# Patient Record
Sex: MALE | Race: WHITE | NOT HISPANIC OR LATINO | URBAN - METROPOLITAN AREA
[De-identification: names, ages, dates, MRNs, and addresses within clinical notes are randomized per-mention and may not be internally consistent; named-entity substitution may affect disease eponyms.]

---

## 2017-06-08 ENCOUNTER — ALLSCRIPTS OFFICE VISIT (OUTPATIENT)
Dept: OTHER | Facility: OTHER | Age: 62
End: 2017-06-08

## 2018-01-10 NOTE — MISCELLANEOUS
Message   Recorded as Task   Date: 02/22/2016 09:10 AM, Created By: Segun Rodriguez   Task Name: Call Back   Assigned To: Cheyenne Lila   Regarding Patient: Reji Au, Status: Active   Comment:    Nella Simon - 22 Feb 2016 9:10 AM     TASK CREATED  please call pt about a reaction he got from a test   home# [phone]   Cheyenne Sharp - 22 Feb 2016 4:05 PM     TASK EDITED  Marycruz Gopi reports a rash that began about a week after his fourth infusion  He states he had another infusion recently and has been no change  He denies any itchiness to the rash  He states that he is scheduled to see a dermatologist later in the week   He has noticed some improvement in the strength of his legs with the IVIG but continues to have some difficulty with stairs        Signatures   Electronically signed by : Chance Zuleta MD; Feb 22 2016  4:05PM EST                       (Author)

## 2018-01-10 NOTE — MISCELLANEOUS
Message   Recorded as Task   Date: 03/28/2016 02:52 PM, Created By: Morena Hahn   Task Name: Call Back   Assigned To: Vinicio Henriquez   Regarding Patient: Evan Kiser, Status: Active   Comment:    Morena Hahn - 28 Mar 2016 2:52 PM     TASK CREATED  Caller: Self; [phone] (Home); [phone] (Work)    pt would like to speak with navjot Henriquez - 28 Mar 2016 4:02 PM     TASK EDITED  Alan's employer will not accept him back unless he is 100% thus, he is totally disabled          Signatures   Electronically signed by : Leah Steward MD; Mar 28 2016  4:02PM EST                       (Author)

## 2018-01-12 NOTE — PROCEDURES
Results/Data    Procedure: Electromyogram and Nerve Conduction Study  Indication: Bilateral Lower Extremities   Referred by Dr Haynes  The procedure's were discussed with the patient  Written consent was obtained prior to the procedure and is detailed in the patient's record  Prior to the start of the procedure a time out was taken and the identity of the patient was confirmed via name and date of birth with the patient  The correct site and the procedure to be performed were confirmed  The correct side was confirmed if applicable  The positioning of the patient was verified  The availability of the correct equipment was verified  Procedure Start Time: 10:00    Technique: A sterile concentric needle electrode was used  The patient tolerated the procedure well  There were no complications  Results EMG BILATERAL LOWER EXTREMITY    Motor and sensory conduction studies were performed on the bilateral peroneal, tibial and sural nerves  The distal motor latency of the left peroneal nerve is prolonged at 7 9 ms while the other distal motor latencies were normal  The motor action potential amplitudes of the peroneal nerve were significantly reduced at 0 4 mV end 0 1 mV of the right and left respectively while the posterior tibial potentials were within the normal range with distal stimulation and were of reduced amplitude with more proximal stimulation  Conduction velocities of the peroneal nerve were prolonged at 40 m/s bilaterally slowing of the right tibial motor conduction velocity at 38 m/s while the left tibial conduction velocity is just within the normal range at 42 m/s  Conduction across the fibular head was normal bilaterally  Bilateral peroneal F waves were not obtainable and tibial F waves were prolonged    Bilateral sural distal sensory latencies were prolonged at 3 4 ms with reduced sensory action potential amplitudes      H  reflexes were not obtainable    Concentric needle EMG was performed in various distal and proximal muscles of the lower extremities bilaterally including EDB, tibialis anterior, gastrocnemius medius, vastus lateralis, biceps femoralis short head and the low lumbar paraspinal regions at L3, L4 and L5  There 3+ positive waves and 2+ fibrillation potentials in the right tibialis anterior, 2+ positive waves and 2+ fibrillation potentials the left tibialis anterior, 3+ positive waves and 2+ fibrillations at the quadriceps bilaterally, 1+ fibrillation potentials in the left EDB and 2+ fibrillation potentials in the left gastrocnemius medius region  No other spontaneous activities were seen  A discrete interference pattern with polyphasic potentials was noted in the EDB region bilaterally, the tibialis anterior region bilaterally as well as in the quadriceps area bilaterally The other compound motor unit potentials were of normal configuration and interference patterns were full or full for effort  IMPRESSION: This is an abnormal EMG of the bilateral lower extremities due to 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

## 2019-02-28 ENCOUNTER — TELEPHONE (OUTPATIENT)
Dept: GASTROENTEROLOGY | Facility: CLINIC | Age: 64
End: 2019-02-28

## 2019-02-28 NOTE — TELEPHONE ENCOUNTER
Called and spoke to patient he stated his PCP wants him to have a EGD     He's having intense abdominal pain and feel food sitting on his chest, belching     He was recently in the ER for the pain

## 2021-04-13 DIAGNOSIS — Z23 ENCOUNTER FOR IMMUNIZATION: ICD-10-CM

## 2024-05-17 ENCOUNTER — TELEPHONE (OUTPATIENT)
Dept: SURGERY | Facility: CLINIC | Age: 69
End: 2024-05-17

## 2025-03-21 ENCOUNTER — OFFICE VISIT (OUTPATIENT)
Dept: OBGYN CLINIC | Facility: CLINIC | Age: 70
End: 2025-03-21
Payer: COMMERCIAL

## 2025-03-21 ENCOUNTER — APPOINTMENT (OUTPATIENT)
Dept: RADIOLOGY | Facility: CLINIC | Age: 70
End: 2025-03-21
Payer: COMMERCIAL

## 2025-03-21 VITALS — BODY MASS INDEX: 25.16 KG/M2 | HEIGHT: 68 IN | WEIGHT: 166 LBS | RESPIRATION RATE: 18 BRPM

## 2025-03-21 DIAGNOSIS — R29.898 WEAKNESS OF BOTH LOWER EXTREMITIES: ICD-10-CM

## 2025-03-21 DIAGNOSIS — M25.461 EFFUSION OF RIGHT KNEE: ICD-10-CM

## 2025-03-21 DIAGNOSIS — M17.11 PRIMARY OSTEOARTHRITIS OF RIGHT KNEE: ICD-10-CM

## 2025-03-21 DIAGNOSIS — Z98.890 HISTORY OF THORACIC SURGERY: ICD-10-CM

## 2025-03-21 DIAGNOSIS — M25.561 RIGHT KNEE PAIN, UNSPECIFIED CHRONICITY: ICD-10-CM

## 2025-03-21 DIAGNOSIS — M25.561 ACUTE PAIN OF RIGHT KNEE: Primary | ICD-10-CM

## 2025-03-21 PROCEDURE — 20610 DRAIN/INJ JOINT/BURSA W/O US: CPT | Performed by: ORTHOPAEDIC SURGERY

## 2025-03-21 PROCEDURE — 99203 OFFICE O/P NEW LOW 30 MIN: CPT | Performed by: ORTHOPAEDIC SURGERY

## 2025-03-21 PROCEDURE — 73564 X-RAY EXAM KNEE 4 OR MORE: CPT

## 2025-03-21 RX ORDER — METHYLPREDNISOLONE ACETATE 40 MG/ML
2 INJECTION, SUSPENSION INTRA-ARTICULAR; INTRALESIONAL; INTRAMUSCULAR; SOFT TISSUE
Status: COMPLETED | OUTPATIENT
Start: 2025-03-21 | End: 2025-03-21

## 2025-03-21 RX ORDER — ATORVASTATIN CALCIUM 40 MG/1
TABLET, FILM COATED ORAL
COMMUNITY
Start: 2025-02-06

## 2025-03-21 RX ORDER — HYDROCHLOROTHIAZIDE 12.5 MG/1
12.5 CAPSULE ORAL DAILY
COMMUNITY

## 2025-03-21 RX ORDER — KETOCONAZOLE 20 MG/G
CREAM TOPICAL
COMMUNITY
Start: 2025-01-13

## 2025-03-21 RX ORDER — GLIPIZIDE 10 MG/1
TABLET ORAL
COMMUNITY

## 2025-03-21 RX ORDER — BUPIVACAINE HYDROCHLORIDE 2.5 MG/ML
2 INJECTION, SOLUTION INFILTRATION; PERINEURAL
Status: COMPLETED | OUTPATIENT
Start: 2025-03-21 | End: 2025-03-21

## 2025-03-21 RX ORDER — KETOCONAZOLE 20 MG/ML
SHAMPOO, SUSPENSION TOPICAL
COMMUNITY
Start: 2025-01-13

## 2025-03-21 RX ORDER — DOXYCYCLINE 100 MG/1
CAPSULE ORAL
COMMUNITY

## 2025-03-21 RX ORDER — LIDOCAINE HYDROCHLORIDE 10 MG/ML
2 INJECTION, SOLUTION INFILTRATION; PERINEURAL
Status: COMPLETED | OUTPATIENT
Start: 2025-03-21 | End: 2025-03-21

## 2025-03-21 RX ORDER — EMPAGLIFLOZIN 25 MG/1
TABLET, FILM COATED ORAL
COMMUNITY

## 2025-03-21 RX ORDER — PANTOPRAZOLE SODIUM 20 MG/1
20 TABLET, DELAYED RELEASE ORAL DAILY
COMMUNITY

## 2025-03-21 RX ADMIN — BUPIVACAINE HYDROCHLORIDE 2 ML: 2.5 INJECTION, SOLUTION INFILTRATION; PERINEURAL at 11:45

## 2025-03-21 RX ADMIN — LIDOCAINE HYDROCHLORIDE 2 ML: 10 INJECTION, SOLUTION INFILTRATION; PERINEURAL at 11:45

## 2025-03-21 RX ADMIN — METHYLPREDNISOLONE ACETATE 2 ML: 40 INJECTION, SUSPENSION INTRA-ARTICULAR; INTRALESIONAL; INTRAMUSCULAR; SOFT TISSUE at 11:45

## 2025-03-21 NOTE — PROGRESS NOTES
Name: Alan Christopher      : 1955      MRN: [id number]  Encounter Provider: Bertin Nascimento DO  Encounter Date: 3/21/2025   Encounter department: [facility]  :  Assessment & Plan  Acute pain of right knee    Orders:    XR knee 4+ vw right injury; Future    Effusion of right knee    Orders:    Large joint arthrocentesis: R knee    Primary osteoarthritis of right knee    Orders:    Large joint arthrocentesis: R knee    History of thoracic surgery    Orders:    Ambulatory Referral to Orthopedic Surgery; Future    Weakness of both lower extremities    Orders:    Ambulatory Referral to Orthopedic Surgery; Future    Right knee effusion with medial and patellofemoral osteoarthritis and chronic lower extremity weakness with history of thoracic and lumbar spine surgery in .   X-rays and exam were reviewed in office today with patient demonstrating mild to moderate osteoarthritis with significant weakness to lower extremity on exam.   Risks and benefits of aspiration and corticosteroid injection were discussed in detail.  Risks including:  Post injection pain, elevation in blood sugar, skin discoloration, fatty atrophy, and infection were discussed in detail.  The patient understood and elected to proceed forward.  After sterile preparation the right was aspirated and 23cc of yellow fluid were removed .  This was followed by  injection with 2 cc of 1% lidocaine, 2 cc of 0.25% bupivacaine, and 2 cc of Depo-Medrol.  The patient tolerated the procedure and no immediate complications were noted.  The patient was instructed to ice and elevate the injection site, limit strenuous activity for the next 24-48 hours, and contact us if there were any questions or concerns prior to their follow-up appointment.     Educated cortisone can be repeated every 3 months if needed, if minimal relief will consider VISCO injections.  Patient is provided with a referral to Dr. Esteban as he  continues with lower extremity weakness and has history of lower thoracic surgery 10 years ago with no further treatment to follow.    I will see the patient back on an as needed basis.     Chief Complaint     Right knee pain    History of the Present Illness   History of Present Illness   HPI   Alan Christopher is a 69 y.o. male here for an initial evaluation of his right knee.  Patient has a history of surgery in [place] to his lower thoracic and lumbar spine in 2015, patient states his nerves were affected from the surgery and has consistent weakness to his lower extremity. He notes the past year his knee will lock up or buckle causing him to fall repeatedly. Recently he has fallen multiple times in the past two weeks and notes anterior knee discomfort with occasional swelling. Patient has history of left knee arthroscopy and menisectomy in the past with MVO along with cortisone and visco injections. He feels the most recent injection to the left knee of Zilretta 5 months ago has provided significant relief. Patient denies injections or physical therapy for his right knee. He has been treating with Voltaren Gel and Tylenol providing mild relief. He denies following up with a spine surgeon or pain management since his surgery 10 years ago. He recently started wearing an AFO brace for his left lower extremity to help with his gait. Patient is a diabetic with last A1c of 11.4 dated 1/6/2025.       Review of Systems     Review of Systems   Constitutional:  Negative for chills and fever.   HENT:  Negative for ear pain and sore throat.    Eyes:  Negative for pain and visual disturbance.   Respiratory:  Negative for cough and shortness of breath.    Cardiovascular:  Negative for chest pain and palpitations.   Gastrointestinal:  Negative for abdominal pain and vomiting.   Genitourinary:  Negative for dysuria and hematuria.   Musculoskeletal:  Positive for arthralgias. Negative for back pain.   Skin:  Negative for color change  "and rash.   Neurological:  Negative for seizures and syncope.   All other systems reviewed and are negative.      Physical Exam   Objective   Resp 18   Ht 5' 8\" (1.727 m)   Wt 75.3 kg (166 lb)   BMI 25.24 kg/m²      Right Knee  Range of motion from 0 to 120 with pain at terminal flexion .    There is mild effusion.    There is  tenderness over the medial.    There is 3/5 quadriceps strength and decreased tone.    The patient is unable to perform a straight leg raise.    Varus stress testing reveals stable at 0 and 30 degrees   Valgus stress testing reveals stable at 0 and 30 degrees  Stephen's testing is   The patient is neurovascularly intact     Lumbar spine  Sensation intact to light touch left L2 through S1 dermatomes.   Sensation intact to light touch right L2 through S1 dermatomes   Left Motor: 5/5 iliopsoas, 4-/5 quadriceps, 1/5 tibialis anterior, 5/5 extensor hallucis longus,   Right Motor: 5/5 iliopsoas, 3/5 quadriceps, 1/5 tibialis anterior, 5/5 extensor hallucis longus, and 5/5 gastrocsoleus.   Negative clonus bilaterally.    Negative Babinski bilaterally  Straight leg raise test is positive Right   The patient is well perfused distally.       Eyes:  Anicteric sclerae.  Neck:  Supple.  Lungs:  Normal respiratory effort.  Cardiovascular:  Capillary refill is less than 2 seconds.  Skin:  Intact without erythema.  Neurologic:  Sensation grossly intact to light touch.  Psychiatric:  Mood and affect are appropriate.    Data Review     I have personally reviewed pertinent films in PACS, and my interpretation follows:    X-rays taken 3/21/2025 of Right knee independently reviewed and demonstrate mild to moderate tricompartmental degenerative changes noted with subtle joint space narrowing osteophyte formation.  Joint effusion noted.  No evidence of fracture or dislocation.     Lab work: Hemoglobin A1c 11.4 dated 1/6/2025    No past medical history on file.    No past surgical history on file.    Allergies " "  Allergen Reactions    Immune Globulin Facial Swelling       Current Outpatient Medications on File Prior to Visit   Medication Sig Dispense Refill    atorvastatin (LIPITOR) 40 mg tablet take 1 tablet by mouth every day at night      doxycycline hyclate (VIBRAMYCIN) 100 mg capsule TAKE 1 CAPSULE BY MOUTH ONCE A DAY DURING THE DAY      glipiZIDE (GLUCOTROL) 10 mg tablet take 1 tablet (10 mg total) by mouth daily with breakfast.      hydroCHLOROthiazide 12.5 mg capsule Take 12.5 mg by mouth daily      Jardiance 25 MG TABS TAKE 1 TABLET (25 MG TOTAL) BY MOUTH DAILY INDICATIONS: TYPE 2 DIABETES MELLITUS.      ketoconazole (NIZORAL) 2 % cream APPLY TO RASH ON HANDS TWICE A DAY FOR 2 WEEKS      ketoconazole (NIZORAL) 2 % shampoo APPLY IN SHOWER ONCE A DAY      pantoprazole (PROTONIX) 20 mg tablet Take 20 mg by mouth daily      [DISCONTINUED] immune globulin, human, (GAMMAGARD) 10 G Infuse into a venous catheter Indications: pt currently receives IV IGG q 3 weeks in outpatient infusion ctr.      [DISCONTINUED] metFORMIN (GLUCOPHAGE) 500 mg tablet Take 500 mg by mouth every evening Indications: Type 2 Diabetes.      [DISCONTINUED] metFORMIN (GLUCOPHAGE) 500 mg tablet Take 1,000 mg by mouth daily with breakfast Indications: Type 2 Diabetes.      [DISCONTINUED] Multiple Vitamins-Minerals (CENTRUM SILVER ADULT 50+) TABS Take 1 tablet by mouth daily.       No current facility-administered medications on file prior to visit.            No family history on file.      Procedures Performed     Large joint arthrocentesis: R knee  Universal Protocol:  Consent: Verbal consent obtained.  Risks and benefits: risks, benefits and alternatives were discussed  Consent given by: patient  Time out: Immediately prior to procedure a \"time out\" was called to verify the correct patient, procedure, equipment, support staff and site/side marked as required.  Patient understanding: patient states understanding of the procedure being " performed  Site marked: the operative site was marked  Supporting Documentation  Indications: pain and joint swelling   Procedure Details  Location: knee - R knee  Preparation: Patient was prepped and draped in the usual sterile fashion  Needle size: 18 G  Ultrasound guidance: no  Approach: anterolateral  Medications administered: 2 mL bupivacaine 0.25 %; 2 mL lidocaine 1 %; 2 mL methylPREDNISolone acetate 40 mg/mL    Aspirate amount: 23 mL  Aspirate: yellow  Patient tolerance: patient tolerated the procedure well with no immediate complications  Dressing:  Sterile dressing applied          Scribe Attestation      I,:  Tina Moore am acting as a scribe while in the presence of the attending physician.:       I,:  Bertin Nascimento DO personally performed the services described in this documentation    as scribed in my presence.:

## 2025-04-02 ENCOUNTER — APPOINTMENT (OUTPATIENT)
Dept: RADIOLOGY | Facility: CLINIC | Age: 70
End: 2025-04-02
Payer: COMMERCIAL

## 2025-04-02 ENCOUNTER — OFFICE VISIT (OUTPATIENT)
Dept: OBGYN CLINIC | Facility: CLINIC | Age: 70
End: 2025-04-02
Payer: COMMERCIAL

## 2025-04-02 VITALS — HEIGHT: 68 IN | WEIGHT: 162.8 LBS | RESPIRATION RATE: 18 BRPM | BODY MASS INDEX: 24.67 KG/M2

## 2025-04-02 DIAGNOSIS — Z98.890 HISTORY OF THORACIC SURGERY: ICD-10-CM

## 2025-04-02 DIAGNOSIS — R29.898 WEAKNESS OF BOTH LOWER EXTREMITIES: ICD-10-CM

## 2025-04-02 PROCEDURE — 72110 X-RAY EXAM L-2 SPINE 4/>VWS: CPT

## 2025-04-02 PROCEDURE — 72050 X-RAY EXAM NECK SPINE 4/5VWS: CPT

## 2025-04-02 PROCEDURE — 99204 OFFICE O/P NEW MOD 45 MIN: CPT | Performed by: ORTHOPAEDIC SURGERY

## 2025-04-02 NOTE — PROGRESS NOTES
"Name: Alan Christopher      : 1955       MRN: [id number]   Encounter Provider: Anai Woodward MD   Encounter Date: 25  Encounter department: [facility] OR[facility]OPED[facility]      History of Present Illness    Alan Christopher is a 69 y.o. male who presents for initial evaluation due to ambulatory dysfunction. He denies any pain in his spine at this time. Reports that he occasionally experiences pain in his low back, which resolves after he takes a muscle relaxer. He endorses weakness in bilateral lower extremities. Denies recent land-based or aqua-based PT. Denies history of spinal injections with pain management. Reports prior history of spine surgery. He has had 2 previous spine surgeries, 1 thoracic and 1 lumbar.     Diabetic: Yes   Nicotine use: Yes   BMI: Estimated body mass index is 24.75 kg/m² as calculated from the following:    Height as of this encounter: 5' 8\" (1.727 m).    Weight as of this encounter: 73.8 kg (162 lb 12.8 oz).  Blood thinners: None      ALLERGIES:   Allergies   Allergen Reactions    Immune Globulin Facial Swelling       MEDICATIONS:    Current Outpatient Medications:     atorvastatin (LIPITOR) 40 mg tablet, take 1 tablet by mouth every day at night, Disp: , Rfl:     doxycycline hyclate (VIBRAMYCIN) 100 mg capsule, TAKE 1 CAPSULE BY MOUTH ONCE A DAY DURING THE DAY, Disp: , Rfl:     glipiZIDE (GLUCOTROL) 10 mg tablet, take 1 tablet (10 mg total) by mouth daily with breakfast., Disp: , Rfl:     hydroCHLOROthiazide 12.5 mg capsule, Take 12.5 mg by mouth daily, Disp: , Rfl:     Jardiance 25 MG TABS, TAKE 1 TABLET (25 MG TOTAL) BY MOUTH DAILY INDICATIONS: TYPE 2 DIABETES MELLITUS., Disp: , Rfl:     ketoconazole (NIZORAL) 2 % cream, APPLY TO RASH ON HANDS TWICE A DAY FOR 2 WEEKS, Disp: , Rfl:     ketoconazole (NIZORAL) 2 % shampoo, APPLY IN SHOWER ONCE A DAY, Disp: , Rfl:     pantoprazole (PROTONIX) 20 mg tablet, Take 20 mg by " mouth daily, Disp: , Rfl:      PAST MEDICAL HISTORY:   History reviewed. No pertinent past medical history.    PAST SURGICAL HISTORY:  History reviewed. No pertinent surgical history.    SOCIAL HISTORY:  Social History     Tobacco Use   Smoking Status Not on file   Smokeless Tobacco Not on file        Physical Exam    69 y.o. male sitting comfortably on exam chair in no apparent distress.   Ambulates with myelopathic gait   Significant atrophy of bilateral lower extremities   Non-TTP over spinous processes and paraspinal muscles  Strength RLE: hip flexion 4/5, knee extension 5/5, knee flexion 5/5 , dorsiflexion 0/5, plantar flexion 5/5  Strength LLE: hip flexion 4/5, knee extension 5/5, knee flexion 5/5 , dorsiflexion 0/5, plantar flexion 5/5  AFO on left side  Foot drop on right side   Strength RUE:  finger abduction 5/5,  strength 5/5, wrist flexion 5/5, wrist extension 5/5 , elbow flexion 5/5, elbow extension 5/5, shoulder external rotation 5/5  Strength LUE:  finger abduction 5/5,  strength 5/5, wrist flexion 5/5, wrist extension 5/5 , elbow flexion 5/5, elbow extension 5/5, shoulder external rotation 5/5  Sensation is intact and equal bilaterally in all extremities   Reflexes:    Absent deep tendon reflexes throughout bilateral lower extremities    R triceps absent, R biceps 1+, R brachioradialis absent   L triceps absent, L biceps 1+, L brachioradialis absent   Radiorelease sign negative          RADIOGRAPHIC STUDIES:  X-rays, cervical spine, 4/2/2025: Severe multilevel cervical degenerative disc disease with loss of cervical lordosis anterior and posterior osteophyte formation.  Cervical kyphosis.  No evidence of instability.  No evidence of subluxation.  X-rays, lumbar spine, 4/2/2025: Severe multilevel lumbar degenerative disc disease and facet arthrosis.  Multilevel early diffuse idiopathic skeletal hyperostosis.  Multilevel enthesopathy.  Prior thoracic laminectomy and fusion with instrumentation  at T11-12.  No evidence of hardware complication.  MRI, lumbar spine, 11/6/2015: Lower thoracic spine was visualized.  The instrumentation appears to be in appropriate position.  The canal is decompressed in the lower thoracic spine.  There is evidence of multilevel lumbar degenerative disc disease and facet arthrosis.  Prior left-sided hemilaminotomy is clearly seen at L3-4 and L4-5.  Persistent lateral recess stenosis is present on the left side at L4-5.  There is no central canal stenosis.  Varying degree of lateral systems present throughout the lumbar spine.  At L5-S1 there is evidence of right-sided disc herniation with severe stenosis      Assessment & Plan  History of thoracic surgery    Orders:    XR spine cervical complete 4 or 5 vw non injury; Future    XR spine lumbar minimum 4 views non injury; Future    Ambulatory Referral to Orthopedic Surgery    MRI thoracic spine wo contrast; Future    MRI lumbar spine wo contrast; Future    MRI cervical spine wo contrast; Future    Weakness of both lower extremities    Orders:    XR spine cervical complete 4 or 5 vw non injury; Future    XR spine lumbar minimum 4 views non injury; Future    Ambulatory Referral to Orthopedic Surgery    MRI thoracic spine wo contrast; Future    MRI lumbar spine wo contrast; Future    MRI cervical spine wo contrast; Future           PLAN:  69 y.o. male with ambulatory dysfunction.  He has had a foot drop and quad weakness for a number of years.  It has been progressive and now he has a foot drop on the right side.  He has been wearing AFO on the left side but does not have an AFO on the right side.  He is unsteady when walking and there is evidence of gross weakness in lower extremity.  Upper extremity strength is within normal limit and reflexes are diminished throughout.     Xray images of the cervical and lumbar, performed on 4/2/2025, were reviewed and discussed with the patient during today's visit.  X-rays of the cervical spine  does show evidence of severe multilevel degenerative changes with kyphosis.  X-rays of the lumbar spine demonstrated prior thoracic fusion at T11-12 with laminectomy.  Severe degenerative changes are present throughout the lumbar spine.    I was able to review multiple diagnostic studies including MRI studies from 2014 and 2015 he is included MRI study lumbar spine from 6/23/2014, 6/13/2015 and 11/6/2015.  There was evidence of stenosis prior to a lumbar hemilaminotomy procedure which was performed on the left side at L3-4 and L4-5.  As of November 2015 significant stenosis was still present in the lumbar spine.    Given the significant ambulatory dysfunction and gross weakness, I believe that multiple MRI studies are necessary.  MRSA thoracic spine and lumbar spine are necessary to evaluate the prior laminectomy, rule out thoracic myelopathy and also evaluate the degree of lumbar stenosis.  MRI cervical spine is also necessary to rule out spinal cord compression.  MRI studies were ordered in the urgent fashion and he will be evaluated next week once MRI studies are completed.    Patient is to return in 1 week for re-evaluation.       Scribe Attestation      I,:  Sally Berumen PA-C am acting as a scribe while in the presence of the attending physician.:       I,:  Anai Woodward MD personally performed the services described in this documentation    as scribed in my presence.:

## 2025-04-03 ENCOUNTER — HOSPITAL ENCOUNTER (OUTPATIENT)
Dept: MRI IMAGING | Facility: CLINIC | Age: 70
Discharge: HOME | End: 2025-04-03
Payer: COMMERCIAL

## 2025-04-03 DIAGNOSIS — Z98.890 HISTORY OF THORACIC SURGERY: ICD-10-CM

## 2025-04-03 DIAGNOSIS — R29.898 WEAKNESS OF BOTH LOWER EXTREMITIES: ICD-10-CM

## 2025-04-03 PROCEDURE — 72148 MRI LUMBAR SPINE W/O DYE: CPT

## 2025-04-03 PROCEDURE — 72141 MRI NECK SPINE W/O DYE: CPT

## 2025-04-03 PROCEDURE — 72146 MRI CHEST SPINE W/O DYE: CPT

## 2025-04-09 ENCOUNTER — OFFICE VISIT (OUTPATIENT)
Dept: OBGYN CLINIC | Facility: CLINIC | Age: 70
End: 2025-04-09
Payer: COMMERCIAL

## 2025-04-09 DIAGNOSIS — Z98.890 HISTORY OF THORACIC SURGERY: ICD-10-CM

## 2025-04-09 DIAGNOSIS — R29.898 WEAKNESS OF BOTH LOWER EXTREMITIES: Primary | ICD-10-CM

## 2025-04-09 PROCEDURE — 99214 OFFICE O/P EST MOD 30 MIN: CPT | Performed by: ORTHOPAEDIC SURGERY

## 2025-04-09 NOTE — PROGRESS NOTES
"Name: Alan Christopher      : 1955       MRN: [id number]   Encounter Provider: Anai Woodward MD   Encounter Date: 25  Encounter department: [facility] ORTHOP[facility] ORTHOPEDIC SPINE SURGERY      History of Present Illness    Alan Christopher is a 69 y.o. male who presents for follow-up evaluation due to ambulatory dysfunction. He has had 2 previous spine surgeries, 1 thoracic and 1 lumbar. . He was last seen in the office on 2025, where MRIs of the entire spine were ordered. He presents to the office today to review the results of his recent MRI scans. At this time, he denies any changes in his symptoms since his last visit. He denies any pain in his spine. He notes that he occasionally experiences pain in his low back, which resolves after he takes a muscle relaxer. He continues to endorse weakness in bilateral lower extremities.     Diabetic: Yes   Nicotine use: Yes   BMI: Estimated body mass index is 24.75 kg/m² as calculated from the following:    Height as of 25: 5' 8\" (1.727 m).    Weight as of 25: 73.8 kg (162 lb 12.8 oz).  Blood thinners: None      ALLERGIES:   Allergies   Allergen Reactions    Immune Globulin Facial Swelling       MEDICATIONS:    Current Outpatient Medications:     atorvastatin (LIPITOR) 40 mg tablet, take 1 tablet by mouth every day at night, Disp: , Rfl:     doxycycline hyclate (VIBRAMYCIN) 100 mg capsule, TAKE 1 CAPSULE BY MOUTH ONCE A DAY DURING THE DAY, Disp: , Rfl:     glipiZIDE (GLUCOTROL) 10 mg tablet, take 1 tablet (10 mg total) by mouth daily with breakfast., Disp: , Rfl:     hydroCHLOROthiazide 12.5 mg capsule, Take 12.5 mg by mouth daily, Disp: , Rfl:     Jardiance 25 MG TABS, TAKE 1 TABLET (25 MG TOTAL) BY MOUTH DAILY INDICATIONS: TYPE 2 DIABETES MELLITUS., Disp: , Rfl:     ketoconazole (NIZORAL) 2 % cream, APPLY TO RASH ON HANDS TWICE A DAY FOR 2 WEEKS, Disp: , Rfl:     pantoprazole (PROTONIX) 20 mg tablet, Take 20 " mg by mouth daily, Disp: , Rfl:      PAST MEDICAL HISTORY:   History reviewed. No pertinent past medical history.    PAST SURGICAL HISTORY:  History reviewed. No pertinent surgical history.    SOCIAL HISTORY:  Social History     Tobacco Use   Smoking Status Every Day    Current packs/day: 1.00    Types: Cigarettes   Smokeless Tobacco Never        Physical Exam    69 y.o. male sitting comfortably on exam chair in no apparent distress.   Ambulates with myelopathic gait   Significant atrophy of bilateral lower extremities   Non-TTP over spinous processes and paraspinal muscles    Strength RLE: hip flexion 4/5, knee extension 5/5, knee flexion 5/5 , dorsiflexion 0/5, plantar flexion 5/5  Strength LLE: hip flexion 4/5, knee extension 5/5, knee flexion 5/5 , dorsiflexion 0/5, plantar flexion 5/5  AFO on left side  Foot drop on right side     Strength RUE:  finger abduction 5/5,  strength 5/5, wrist flexion 5/5, wrist extension 5/5 , elbow flexion 5/5, elbow extension 5/5, shoulder external rotation 5/5  Strength LUE:  finger abduction 5/5,  strength 5/5, wrist flexion 5/5, wrist extension 5/5 , elbow flexion 5/5, elbow extension 5/5, shoulder external rotation 5/5  Sensation is intact and equal bilaterally in all extremities     Reflexes:    Absent deep tendon reflexes throughout bilateral lower extremities    R triceps absent, R biceps 1+, R brachioradialis absent   L triceps absent, L biceps 1+, L brachioradialis absent   Radiorelease sign negative    Rapid alternating movement in all extremities         RADIOGRAPHIC STUDIES:  X-rays, cervical spine, 4/2/2025: Severe multilevel cervical degenerative disc disease with loss of cervical lordosis anterior and posterior osteophyte formation.  Cervical kyphosis.  No evidence of instability.  No evidence of subluxation.  X-rays, lumbar spine, 4/2/2025: Severe multilevel lumbar degenerative disc disease and facet arthrosis.  Multilevel early diffuse idiopathic skeletal  hyperostosis.  Multilevel enthesopathy.  Prior thoracic laminectomy and fusion with instrumentation at T11-12.  No evidence of hardware complication.  MRI, lumbar spine, 11/6/2015: Lower thoracic spine was visualized.  The instrumentation appears to be in appropriate position.  The canal is decompressed in the lower thoracic spine.  There is evidence of multilevel lumbar degenerative disc disease and facet arthrosis.  Prior left-sided hemilaminotomy is clearly seen at L3-4 and L4-5.  Persistent lateral recess stenosis is present on the left side at L4-5.  There is no central canal stenosis.  Varying degree of lateral systems present throughout the lumbar spine.  At L5-S1 there is evidence of right-sided disc herniation with severe stenosis  MRI, cervical spine, 4/3/2025: Severe degenerative disc disease at C3-4 through C6-7.  There is evidence of posterior disc protrusions at C3-4, C4-5, C5-6 and C6-7.  Mild central stenosis present every level except at C4-5 where moderate to severe central stenosis and spinal cord compression is present.  There is no evidence of myelomalacia at C4-5.  Uncovertebral hypertrophy and neuroforaminal stenosis present resulting in severe stenosis at C4-5, 5 6 and C6-7.  MRI, thoracic spine, 4/3/2025: Moderate to severe multilevel thoracic degenerative disc disease.  Prior fusion is clearly seen at T11-12 with artifact associated with instrumentation.  There is evidence of posterior disc protrusion and possible ossified formation in the lower thoracic spine resulting in's stenosis.  The stenosis is not significant enough.  CSF is present surrounding the spinal cord throughout the thoracic spine.  There is no evidence of myelomalacia present.  MRI, lumbar spine, 4/3/2025: Severe degenerative disks at L2-3, L3-4, L4-5 and L5-S1.  Prior surgical procedure involving a left hemilaminotomy at L3-4 and possibly L4-5.  There is evidence of severe neuroforaminal stenosis bilaterally at L3-4 L4-5  and possibly L5-S1.  At L5-S1 there is also evidence of lateral recess stenosis on the right side and an extruded disc herniation.  There is evidence of mild to moderate lateral recess stenosis and postop scarring at L3-4 and L4-5.      Assessment & Plan  Weakness of both lower extremities    Orders:    Ambulatory Referral to Physical Therapy; Future    EMG single fiber; Future    History of thoracic surgery    Orders:    Ambulatory Referral to Physical Therapy; Future    EMG single fiber; Future         PLAN:  69 y.o. male with ambulatory dysfunction and bilateral foot drop.  At the time of last visit I did order MRIs to the cervical thoracic and lumbar spine for evaluation of spinal cord compression myelopathy in addition for evaluation of lower extremity weakness and foot drop.  MRI studies have been completed and were reviewed today with the patient.    On physical examination there is [place] no indication of cervical or thoracic myelopathy.  He is capable of performing rapid alternating motions in all 4 extremities.  Reflexes are diminished throughout upper and lower extremities.  He does ambulate with a cavity does not appear to be suffering from myelopathy.  He is wearing AFO on the left ankle.  He has not yet been fitted for AFO on the right ankle.    I did review the MRI scan cervical spine.  There is evidence of spinal cord compression due to disc protrusion and degenerative changes at C4-5.  Lesser degree of stenosis present at other levels of the cervical spine.  There is evidence of multilevel neuroforaminal stenosis but he does not have any arm pain.  The MRI scan is concerning at C4-5.  Given the absence of myelomalacia and lack of clinical evidence of myelopathy, distal lesion needs to be evaluated and monitored in the future.  If there is any sign of myelopathy at any time, cervical decompression will be necessary.    Will respect to the thoracic spine, he has had a prior laminectomy and fusion for  treatment of myelopathy.  There is no evidence of spinal cord compression or flattening and there is no indication of myelomalacia to justify further surgical treatment.  The canal appears to be open at this time and is unlikely that the thoracic spine cord will become symptomatic.     Respect to lumbar spine, he has had a chronic foot drop on the left side and more recent onset of right foot drop.  He does have significant stenosis in the lumbar spine and there appears to be a disc herniation on the right side at L5-S1 which could be contributing to this condition.  The lumbar spine will need to be treated.  I am going to refer him to aqua therapy to take the pressure off the back in the meanwhile of an EMG nerve conduction study was ordered for evaluation of nerve root compression.  If there is any active denervation, that lumbar decompression is reasonable.  If the denervation is chronic and there is no acute denervation, then is unlikely that surgery is going to improve the foot drop on either side.  The water therapy should assist him with his amatory dysfunction.  In the long run lumbar decompression is most likely necessary given the significant effect on his gait.    I we will see him back in 1 month for reevaluation.        Scribe Attestation      I,:  Sally Berumen PA-C am acting as a scribe while in the presence of the attending physician.:       I,:  Anai Woodward MD personally performed the services described in this documentation    as scribed in my presence.:

## 2025-04-10 ENCOUNTER — TELEPHONE (OUTPATIENT)
Dept: OTHER | Facility: HOSPITAL | Age: 70
End: 2025-04-10

## 2025-04-10 NOTE — TELEPHONE ENCOUNTER
Good morning    We were reviewing the EMG order.  They were attempting to move up the EMG however upon review of the order I  do have some questions.      It looks like as per your note he needed a EMG of the lower extremity due to weakness, nerve root compression and to rule out a lumbar radiculopathy.       the order is noted single-fiber EMG and that is usually ordered to rule out myasthenia gravis.  Single-fiber EMG is only performed at the [place] side by 1 physician.    Please clarify what type of EMG study you would like and resubmit the correct order     Thank you

## 2025-04-14 ENCOUNTER — TELEPHONE (OUTPATIENT)
Dept: OBGYN CLINIC | Facility: CLINIC | Age: 70
End: 2025-04-14

## 2025-04-14 DIAGNOSIS — Z98.890 HISTORY OF THORACIC SURGERY: ICD-10-CM

## 2025-04-14 DIAGNOSIS — R29.898 WEAKNESS OF BOTH LOWER EXTREMITIES: Primary | ICD-10-CM

## 2025-04-14 NOTE — TELEPHONE ENCOUNTER
Called the patient to let him know that I scheduled him for an EMG with Dr. Robb on 4/25/25 at 8:00. Pt is working that day and will call the office to reschedule the appointment to a different day.

## 2025-04-25 ENCOUNTER — OFFICE VISIT (OUTPATIENT)
Dept: OBGYN CLINIC | Facility: CLINIC | Age: 70
End: 2025-04-25
Payer: COMMERCIAL

## 2025-04-25 VITALS — HEIGHT: 68 IN | BODY MASS INDEX: 24.98 KG/M2 | WEIGHT: 164.8 LBS

## 2025-04-25 DIAGNOSIS — M25.562 LEFT KNEE PAIN, UNSPECIFIED CHRONICITY: Primary | ICD-10-CM

## 2025-04-25 DIAGNOSIS — M17.11 PRIMARY OSTEOARTHRITIS OF RIGHT KNEE: ICD-10-CM

## 2025-04-25 PROCEDURE — 99213 OFFICE O/P EST LOW 20 MIN: CPT | Performed by: ORTHOPAEDIC SURGERY

## 2025-04-25 PROCEDURE — 20610 DRAIN/INJ JOINT/BURSA W/O US: CPT | Performed by: ORTHOPAEDIC SURGERY

## 2025-04-25 RX ORDER — LIDOCAINE HYDROCHLORIDE 10 MG/ML
2 INJECTION, SOLUTION INFILTRATION; PERINEURAL
Status: COMPLETED | OUTPATIENT
Start: 2025-04-25 | End: 2025-04-25

## 2025-04-25 RX ORDER — BUPIVACAINE HYDROCHLORIDE 2.5 MG/ML
2 INJECTION, SOLUTION INFILTRATION; PERINEURAL
Status: COMPLETED | OUTPATIENT
Start: 2025-04-25 | End: 2025-04-25

## 2025-04-25 RX ORDER — METHYLPREDNISOLONE ACETATE 40 MG/ML
2 INJECTION, SUSPENSION INTRA-ARTICULAR; INTRALESIONAL; INTRAMUSCULAR; SOFT TISSUE
Status: COMPLETED | OUTPATIENT
Start: 2025-04-25 | End: 2025-04-25

## 2025-04-25 RX ADMIN — METHYLPREDNISOLONE ACETATE 2 ML: 40 INJECTION, SUSPENSION INTRA-ARTICULAR; INTRALESIONAL; INTRAMUSCULAR; SOFT TISSUE at 13:15

## 2025-04-25 RX ADMIN — LIDOCAINE HYDROCHLORIDE 2 ML: 10 INJECTION, SOLUTION INFILTRATION; PERINEURAL at 13:15

## 2025-04-25 RX ADMIN — BUPIVACAINE HYDROCHLORIDE 2 ML: 2.5 INJECTION, SOLUTION INFILTRATION; PERINEURAL at 13:15

## 2025-04-25 NOTE — PROGRESS NOTES
"Name: Alan Christopher      : 1955      MRN: [id number]  Encounter Provider: Bertin Nascimento DO  Encounter Date: 2025   Encounter department: [facility] ORTHOPEDIC CARE SPECIALISTS [place]  :  Assessment & Plan  Left knee pain, unspecified chronicity    Orders:    Large joint arthrocentesis: L knee    Primary osteoarthritis of right knee             Left knee pain, Right knee osteoarthritis   In regard to Right knee, continue to monitor symptoms, gentle range of motion, and OTC medication if needed  At this time, recommended nonoperative management of Left knee pain and patient wished to Left knee CSI. Risks discussed. Offered aspiration and CSI. Aspirated 17cc of clear synovial fluid and provided CSI through same 18g needle. Tolerated well.   If patient's persist, can consider placing order for Zilretta. Can also consider visco injections.  In regard to the Right knee, if symptoms persist or worsen, can consider further imaging for evaluation of cartilage surfaces and rule out meniscus tear.   Continue OTC medication as needed   Follow up 3 months, Left knee x-rays upon arrival       History of the Present Illness   History of Present Illness   HPI   Alan Christopher is a 69 y.o. male with Right knee osteoarthritis s/p CSI performed 2025. Today, patient reports he is feeling okay today and he is not using the walker. He reports the Right knee feels \"spongey\". Overall, the Right knee is feeling improved. He does report worsening Left knee pain since his last Zilretta injection with Dr Reed a few years ago.         Review of Systems     Review of Systems   Constitutional:  Negative for chills and fever.   HENT:  Negative for ear pain and sore throat.    Eyes:  Negative for pain and visual disturbance.   Respiratory:  Negative for cough and shortness of breath.    Cardiovascular:  Negative for chest pain and palpitations.   Gastrointestinal:  Negative for abdominal pain and vomiting. " "  Genitourinary:  Negative for dysuria and hematuria.   Musculoskeletal:  Negative for arthralgias and back pain.   Skin:  Negative for color change and rash.   Neurological:  Negative for seizures and syncope.   All other systems reviewed and are negative.      Physical Exam   Objective   Ht 5' 8\" (1.727 m)   Wt 74.8 kg (164 lb 12.8 oz)   BMI 25.06 kg/m²        Right Knee  Range of motion from 0 to 120 degrees.    There is trace effusion.    There is minimal to no tenderness over the medial joint line.    The patient is able to perform a straight leg raise.    Varus stress testing reveals no pain or instability at 0 and 30 degrees   Valgus stress testing reveals no pain or instability at 0 and 30 degrees  The patient is neurovascular intact distally.    Left Knee  Range of motion from 0 to 120 degrees.    There is trace effusion.    There is tenderness over the medial joint line.    The patient is able to perform a straight leg raise.    Varus stress testing reveals no pain or instability at 0 and 30 degrees   Valgus stress testing reveals no pain or instability at 0 and 30 degrees  The patient is neurovascular intact distally.      Data Review     I have personally reviewed pertinent films in PACS, and my interpretation follows.    X-rays taken 3/21/2025 of Right knee demonstrate mild to moderate tricompartmental degenerative changes noted with subtle joint space narrowing osteophyte formation.  Joint effusion noted.  No evidence of fracture or dislocation.     Lab Results   Component Value Date/Time    HGBA1C 11.4 (H) 01/06/2025 07:30 AM       Social History     Tobacco Use    Smoking status: Every Day     Current packs/day: 1.00     Types: Cigarettes    Smokeless tobacco: Never   Vaping Use    Vaping status: Never Used   Substance Use Topics    Alcohol use: Never    Drug use: Never           Large joint arthrocentesis: L knee  Universal Protocol:  Risks and benefits: risks, benefits and alternatives were " discussed  Consent given by: patient  Patient identity confirmed: verbally with patient    Is this a Visco injection? NoProcedure Details  Location: knee - L knee  Needle size: 18 G  Approach: lateral  Medications administered: 2 mL bupivacaine 0.25 %; 2 mL lidocaine 1 %; 2 mL methylPREDNISolone acetate 40 mg/mL    Aspirate amount: 17 mL  Aspirate: clear and yellow  Patient tolerance: patient tolerated the procedure well with no immediate complications  Dressing:  Sterile dressing applied            Domniique Acuna PA-C

## 2025-05-14 ENCOUNTER — OFFICE VISIT (OUTPATIENT)
Dept: OBGYN CLINIC | Facility: CLINIC | Age: 70
End: 2025-05-14
Payer: COMMERCIAL

## 2025-05-14 VITALS — WEIGHT: 164 LBS | HEIGHT: 68 IN | RESPIRATION RATE: 18 BRPM | BODY MASS INDEX: 24.86 KG/M2

## 2025-05-14 DIAGNOSIS — R29.898 WEAKNESS OF BOTH LOWER EXTREMITIES: Primary | ICD-10-CM

## 2025-05-14 DIAGNOSIS — M48.061 SPINAL STENOSIS OF LUMBAR REGION, UNSPECIFIED WHETHER NEUROGENIC CLAUDICATION PRESENT: ICD-10-CM

## 2025-05-14 DIAGNOSIS — M21.371 BILATERAL FOOT-DROP: ICD-10-CM

## 2025-05-14 DIAGNOSIS — Z98.890 HISTORY OF THORACIC SURGERY: ICD-10-CM

## 2025-05-14 DIAGNOSIS — M21.372 BILATERAL FOOT-DROP: ICD-10-CM

## 2025-05-14 PROCEDURE — 99214 OFFICE O/P EST MOD 30 MIN: CPT | Performed by: ORTHOPAEDIC SURGERY

## 2025-05-14 RX ORDER — SULFAMETHOXAZOLE AND TRIMETHOPRIM 800; 160 MG/1; MG/1
1 TABLET ORAL 2 TIMES DAILY
COMMUNITY
Start: 2025-05-12 | End: 2025-05-19

## 2025-05-14 RX ORDER — CEPHALEXIN 500 MG/1
500 CAPSULE ORAL 4 TIMES DAILY
COMMUNITY
Start: 2025-05-12 | End: 2025-05-19

## 2025-05-14 NOTE — PROGRESS NOTES
"Name: Alan Christopher      : 1955       MRN: [id number]   Encounter Provider: Anai Woodward MD   Encounter Date: 25  Encounter department: [facility] ORTHOPED[facility] CARE SPECIALISTS [facility] ORTHOPEDIC SPINE SURGERY      History of Present Illness    Alan Christopher is a 69 y.o. male who presents for follow-up evaluation due to ambulatory dysfunction. He has had 2 previous spine surgeries, 1 thoracic and 1 lumbar. He was last seen in the office on 2025, where an EMG of bilateral lower extremity was ordered. At this time, he denies any changes in his symptoms since his last visit. He denies any pain in his spine. He notes that he occasionally experiences pain in his low back, which resolves after he takes a muscle relaxer. He continues to endorse weakness in bilateral lower extremities.     Diabetic: Yes   Nicotine use: Yes   BMI: Estimated body mass index is 24.94 kg/m² as calculated from the following:    Height as of this encounter: 5' 8\" (1.727 m).    Weight as of this encounter: 74.4 kg (164 lb).  Blood thinners: None      ALLERGIES:   Allergies   Allergen Reactions    Immune Globulin Facial Swelling       MEDICATIONS:    Current Outpatient Medications:     atorvastatin (LIPITOR) 40 mg tablet, , Disp: , Rfl:     cephalexin (KEFLEX) 500 mg capsule, Take 500 mg by mouth 4 (four) times a day, Disp: , Rfl:     doxycycline hyclate (VIBRAMYCIN) 100 mg capsule, , Disp: , Rfl:     glipiZIDE (GLUCOTROL) 10 mg tablet, , Disp: , Rfl:     hydroCHLOROthiazide 12.5 mg capsule, Take 12.5 mg by mouth in the morning., Disp: , Rfl:     Jardiance 25 MG TABS, , Disp: , Rfl:     pantoprazole (PROTONIX) 20 mg tablet, Take 20 mg by mouth in the morning., Disp: , Rfl:     sulfamethoxazole-trimethoprim (BACTRIM DS) 800-160 mg per tablet, Take 1 tablet by mouth 2 (two) times a day, Disp: , Rfl:      PAST MEDICAL HISTORY:   No past medical history on file.    PAST SURGICAL HISTORY:  No past surgical history " on file.    SOCIAL HISTORY:  Social History     Tobacco Use   Smoking Status Every Day    Current packs/day: 1.00    Types: Cigarettes   Smokeless Tobacco Never        Physical Exam    69 y.o. male sitting comfortably on exam chair in no apparent distress.   Ambulates with myelopathic gait   Significant atrophy of bilateral lower extremities   Non-TTP over spinous processes and paraspinal muscles    Strength RLE: hip flexion 4/5, knee extension 5/5, knee flexion 5/5 , dorsiflexion 0/5, plantar flexion 5/5  Strength LLE: hip flexion 4/5, knee extension 5/5, knee flexion 5/5 , dorsiflexion 0/5, plantar flexion 5/5  Bilateral foot drop  AFO on left side    Strength RUE:  finger abduction 5/5,  strength 5/5, wrist flexion 5/5, wrist extension 5/5 , elbow flexion 5/5, elbow extension 5/5, shoulder external rotation 5/5  Strength LUE:  finger abduction 5/5,  strength 5/5, wrist flexion 5/5, wrist extension 5/5 , elbow flexion 5/5, elbow extension 5/5, shoulder external rotation 5/5  Sensation is intact and equal bilaterally in all extremities     Reflexes:    Absent deep tendon reflexes throughout bilateral lower extremities    R triceps absent, R biceps 1+, R brachioradialis absent   L triceps absent, L biceps 1+, L brachioradialis absent   Radiorelease sign negative    Rapid alternating movement normal in all extremities         RADIOGRAPHIC STUDIES:  X-rays, cervical spine, 4/2/2025: Severe multilevel cervical degenerative disc disease with loss of cervical lordosis anterior and posterior osteophyte formation.  Cervical kyphosis.  No evidence of instability.  No evidence of subluxation.  X-rays, lumbar spine, 4/2/2025: Severe multilevel lumbar degenerative disc disease and facet arthrosis.  Multilevel early diffuse idiopathic skeletal hyperostosis.  Multilevel enthesopathy.  Prior thoracic laminectomy and fusion with instrumentation at T11-12.  No evidence of hardware complication.  MRI, lumbar spine,  11/6/2015: Lower thoracic spine was visualized.  The instrumentation appears to be in appropriate position.  The canal is decompressed in the lower thoracic spine.  There is evidence of multilevel lumbar degenerative disc disease and facet arthrosis.  Prior left-sided hemilaminotomy is clearly seen at L3-4 and L4-5.  Persistent lateral recess stenosis is present on the left side at L4-5.  There is no central canal stenosis.  Varying degree of lateral systems present throughout the lumbar spine.  At L5-S1 there is evidence of right-sided disc herniation with severe stenosis  MRI, cervical spine, 4/3/2025: Severe degenerative disc disease at C3-4 through C6-7.  There is evidence of posterior disc protrusions at C3-4, C4-5, C5-6 and C6-7.  Mild central stenosis present every level except at C4-5 where moderate to severe central stenosis and spinal cord compression is present.  There is no evidence of myelomalacia at C4-5.  Uncovertebral hypertrophy and neuroforaminal stenosis present resulting in severe stenosis at C4-5, 5 6 and C6-7.  MRI, thoracic spine, 4/3/2025: Moderate to severe multilevel thoracic degenerative disc disease.  Prior fusion is clearly seen at T11-12 with artifact associated with instrumentation.  There is evidence of posterior disc protrusion and possible ossified formation in the lower thoracic spine resulting in's stenosis.  The stenosis is not significant enough.  CSF is present surrounding the spinal cord throughout the thoracic spine.  There is no evidence of myelomalacia present.  MRI, lumbar spine, 4/3/2025: Severe degenerative disks at L2-3, L3-4, L4-5 and L5-S1.  Prior surgical procedure involving a left hemilaminotomy at L3-4 and possibly L4-5.  There is evidence of severe neuroforaminal stenosis bilaterally at L3-4 L4-5 and possibly L5-S1.  At L5-S1 there is also evidence of lateral recess stenosis on the right side and an extruded disc herniation.  There is evidence of mild to moderate  lateral recess stenosis and postop scarring at L3-4 and L4-5.  EMG, bilateral lower extremities, 5/12/2025:       Assessment & Plan  Weakness of both lower extremities         History of thoracic surgery         Spinal stenosis of lumbar region, unspecified whether neurogenic claudication present              PLAN:  69 y.o. male with ambulatory dysfunction and bilateral foot drop.      On physical examination there is no indication of cervical or thoracic myelopathy.  He is capable of performing rapid alternating motions in all 4 extremities.  Reflexes are diminished throughout upper and lower extremities.  He does ambulate with a cane and does not appear to be suffering from myelopathy.  He is wearing AFO on the left ankle.  He has not yet been fitted for AFO on the right ankle.    EMG results of bilateral lower extremities, performed on 5/12/2025, were reviewed and discussed with the patient during today's visit. Recent MRI images of the entire spine were, once again, reviewed and discussed. Discussed the natural history of foot drop. Potential options for treatment were discussed, as well.  He suffers from bilateral foot drop.  Left foot drop has been present since the last surgery.  He has been wearing an AFO brace for extended period of time.    The left foot drop appears to be related to a disc herniation at L5-S1.  There is an extruded fragment clearly seen on the prior MRI study which is most likely responsible for the right foot drop.  Natural history of foot drop associate with the description was discussed.  Given the fact that this is soft this, it is reasonable to treat conservatively.      There was some concern brought on the EMG study regarding a possible CMT.  There is no indication on physical examination that the patient is suffering from Charcot-Lynette-Tooth syndrome.    I did suggest a short course of physical therapy to see if we can regain the strength of the right ankle.  His last left AFO is  quite old and a Velcro no longer functional.  He does need a right AFO.  Bilateral AFOs were prescribed today.    Patient is to return in 2 months for re-evaluation. MRI of the lumbar spine will be ordered at that time.       Scribe Attestation      I,:  Sally Berumen PA-C am acting as a scribe while in the presence of the attending physician.:       I,:  Anai Woodward MD personally performed the services described in this documentation    as scribed in my presence.:

## 2025-07-16 ENCOUNTER — OFFICE VISIT (OUTPATIENT)
Dept: OBGYN CLINIC | Facility: CLINIC | Age: 70
End: 2025-07-16
Payer: COMMERCIAL

## 2025-07-16 VITALS — BODY MASS INDEX: 24.58 KG/M2 | HEIGHT: 68 IN | WEIGHT: 162.2 LBS

## 2025-07-16 DIAGNOSIS — R29.898 WEAKNESS OF BOTH LOWER EXTREMITIES: Primary | ICD-10-CM

## 2025-07-16 DIAGNOSIS — M21.372 BILATERAL FOOT-DROP: ICD-10-CM

## 2025-07-16 DIAGNOSIS — M21.371 BILATERAL FOOT-DROP: ICD-10-CM

## 2025-07-16 DIAGNOSIS — Z98.890 HISTORY OF THORACIC SURGERY: ICD-10-CM

## 2025-07-16 DIAGNOSIS — M48.061 SPINAL STENOSIS OF LUMBAR REGION, UNSPECIFIED WHETHER NEUROGENIC CLAUDICATION PRESENT: ICD-10-CM

## 2025-07-16 PROCEDURE — 99213 OFFICE O/P EST LOW 20 MIN: CPT | Performed by: ORTHOPAEDIC SURGERY

## 2025-07-16 NOTE — PROGRESS NOTES
"Name: Alan Christopher      : 1955       MRN: [id number]   Encounter Provider: Anai Woodward MD   Encounter Date: 25  Encounter department: [facility]      History of Present Illness    Alan Christopher is a 69 y.o. male who presents for follow-up evaluation due to ambulatory dysfunction. He has had 2 previous spine surgeries, 1 thoracic and 1 lumbar. He was last seen in the office on 2025. At this time, he denies any changes in his symptoms since his last visit. He denies any pain in his spine at this time. He notes that he occasionally experiences pain in his low back, which resolves after he takes a muscle relaxer. He continues to endorse weakness in bilateral lower extremities.     Diabetic: Yes   Nicotine use: Yes   BMI: Estimated body mass index is 24.66 kg/m² as calculated from the following:    Height as of this encounter: 5' 8\" (1.727 m).    Weight as of this encounter: 73.6 kg (162 lb 3.2 oz).  Blood thinners: None      ALLERGIES:   Allergies   Allergen Reactions    Immune Globulin Facial Swelling       MEDICATIONS:    Current Outpatient Medications:     atorvastatin (LIPITOR) 40 mg tablet, , Disp: , Rfl:     glipiZIDE (GLUCOTROL) 10 mg tablet, , Disp: , Rfl:     hydroCHLOROthiazide 12.5 mg capsule, Take 12.5 mg by mouth in the morning., Disp: , Rfl:     Jardiance 25 MG TABS, , Disp: , Rfl:     pantoprazole (PROTONIX) 20 mg tablet, Take 20 mg by mouth in the morning., Disp: , Rfl:      PAST MEDICAL HISTORY:   Past Medical History:   Diagnosis Date    Diabetes mellitus (HCC) 2017    Hypertension 2017       PAST SURGICAL HISTORY:  Past Surgical History:   Procedure Laterality Date    ANKLE SURGERY      Pins in ankle    BACK SURGERY  2015    T11-t12.  L2,3,4    HAND SURGERY  2019    Thumb    KNEE SURGERY  2015    Torn meniscus       SOCIAL HISTORY:  Social History     Tobacco Use   Smoking Status Every Day    " Current packs/day: 1.00    Average packs/day: 1 pack/day for 35.0 years (35.0 ttl pk-yrs)    Types: Cigarettes   Smokeless Tobacco Current        Physical Exam    69 y.o. male sitting comfortably on exam chair in no apparent distress.   Ambulates with myelopathic gait   Significant atrophy of bilateral lower extremities   Non-TTP over spinous processes and paraspinal muscles    Strength RLE: hip flexion 4-/5, knee extension 4+/5, knee flexion 5/5 , dorsiflexion 0/5, plantar flexion 5/5  Strength LLE: hip flexion 4-/5, knee extension 4+/5, knee flexion 5/5 , dorsiflexion 0/5, plantar flexion 5/5  Bilateral foot drop  AFO on left side    Strength RUE:  finger abduction 5/5,  strength 5/5, wrist flexion 5/5, wrist extension 5/5 , elbow flexion 5/5, elbow extension 5/5, shoulder external rotation 5/5  Strength LUE:  finger abduction 5/5,  strength 5/5, wrist flexion 5/5, wrist extension 5/5 , elbow flexion 5/5, elbow extension 5/5, shoulder external rotation 5/5  Sensation is intact and equal bilaterally in all extremities     Reflexes:    Absent deep tendon reflexes throughout bilateral lower extremities    R triceps absent, R biceps 1+, R brachioradialis absent   L triceps absent, L biceps 1+, L brachioradialis absent   Radiorelease sign negative    Rapid alternating movement normal in all extremities         RADIOGRAPHIC STUDIES:  X-rays, cervical spine, 4/2/2025: Severe multilevel cervical degenerative disc disease with loss of cervical lordosis anterior and posterior osteophyte formation.  Cervical kyphosis.  No evidence of instability.  No evidence of subluxation.  X-rays, lumbar spine, 4/2/2025: Severe multilevel lumbar degenerative disc disease and facet arthrosis.  Multilevel early diffuse idiopathic skeletal hyperostosis.  Multilevel enthesopathy.  Prior thoracic laminectomy and fusion with instrumentation at T11-12.  No evidence of hardware complication.  MRI, lumbar spine, 11/6/2015: Lower thoracic  spine was visualized.  The instrumentation appears to be in appropriate position.  The canal is decompressed in the lower thoracic spine.  There is evidence of multilevel lumbar degenerative disc disease and facet arthrosis.  Prior left-sided hemilaminotomy is clearly seen at L3-4 and L4-5.  Persistent lateral recess stenosis is present on the left side at L4-5.  There is no central canal stenosis.  Varying degree of lateral systems present throughout the lumbar spine.  At L5-S1 there is evidence of right-sided disc herniation with severe stenosis  MRI, cervical spine, 4/3/2025: Severe degenerative disc disease at C3-4 through C6-7.  There is evidence of posterior disc protrusions at C3-4, C4-5, C5-6 and C6-7.  Mild central stenosis present every level except at C4-5 where moderate to severe central stenosis and spinal cord compression is present.  There is no evidence of myelomalacia at C4-5.  Uncovertebral hypertrophy and neuroforaminal stenosis present resulting in severe stenosis at C4-5, 5 6 and C6-7.  MRI, thoracic spine, 4/3/2025: Moderate to severe multilevel thoracic degenerative disc disease.  Prior fusion is clearly seen at T11-12 with artifact associated with instrumentation.  There is evidence of posterior disc protrusion and possible ossified formation in the lower thoracic spine resulting in's stenosis.  The stenosis is not significant enough.  CSF is present surrounding the spinal cord throughout the thoracic spine.  There is no evidence of myelomalacia present.  MRI, lumbar spine, 4/3/2025: Severe degenerative disks at L2-3, L3-4, L4-5 and L5-S1.  Prior surgical procedure involving a left hemilaminotomy at L3-4 and possibly L4-5.  There is evidence of severe neuroforaminal stenosis bilaterally at L3-4 L4-5 and possibly L5-S1.  At L5-S1 there is also evidence of lateral recess stenosis on the right side and an extruded disc herniation.  There is evidence of mild to moderate lateral recess stenosis  and postop scarring at L3-4 and L4-5.  EMG, bilateral lower extremities, 5/12/2025:       Assessment & Plan  Weakness of both lower extremities    Orders:    Ambulatory Referral to Physical Therapy; Future    MRI lumbar spine wo contrast; Future    Spinal stenosis of lumbar region, unspecified whether neurogenic claudication present    Orders:    Ambulatory Referral to Physical Therapy; Future    MRI lumbar spine wo contrast; Future    Bilateral foot-drop    Orders:    Ambulatory Referral to Physical Therapy; Future    MRI lumbar spine wo contrast; Future    History of thoracic surgery    Orders:    Ambulatory Referral to Physical Therapy; Future    MRI lumbar spine wo contrast; Future         PLAN:  69 y.o. male with ambulatory dysfunction and bilateral foot drop.      On physical examination there is no indication of cervical or thoracic myelopathy.  He is capable of performing rapid alternating motions in all 4 extremities.  Reflexes are diminished throughout upper and lower extremities.  He does ambulate with a cane and does not appear to be suffering from myelopathy.  He is wearing AFO on the left ankle.  He is getting an AFO for the right ankle later today.     EMG results and recent MRI images of the entire spine were, once again, reviewed and discussed. Discussed the natural history of foot drop. Potential options for treatment were discussed, as well.      The left foot drop appears to be related to a disc herniation at L5-S1.  There is an extruded fragment clearly seen on the prior MRI study which is most likely responsible for the right foot drop.  Natural history of foot drop associate with the description was discussed.  A repeat MRI of the lumbar spine was ordered during today's visit to re-evaluate the disc herniation at L5-S1.     Patient is to return in 2 months for re-evaluation.       Scribe Attestation      I,:  Sally Berumen PA-C am acting as a scribe while in the presence of the  attending physician.:       I,:  Anai Woodward MD personally performed the services described in this documentation    as scribed in my presence.:

## 2025-07-16 NOTE — ASSESSMENT & PLAN NOTE
Orders:    Ambulatory Referral to Physical Therapy; Future    MRI lumbar spine wo contrast; Future

## 2025-07-21 ENCOUNTER — TELEPHONE (OUTPATIENT)
Dept: OBGYN CLINIC | Facility: CLINIC | Age: 70
End: 2025-07-21

## 2025-07-21 NOTE — TELEPHONE ENCOUNTER
Spoke to pt 7/21/25 and asked that he call Carole at [phone] as Carole will coordinate meeting for readjustment of AFO.

## 2025-07-21 NOTE — TELEPHONE ENCOUNTER
Caller: Alan     Doctor: Dr. Woodward / [place]    Reason for call: Patient was seen in office today.  Patient was fitted for an AFO and he is having some trouble and will need it adjusted.    Please call to schedule a good time to come to office.  He was seen by Carole today.     Please call to advise     Call back#: [phone]

## 2025-08-01 ENCOUNTER — OFFICE VISIT (OUTPATIENT)
Dept: OBGYN CLINIC | Facility: CLINIC | Age: 70
End: 2025-08-01
Payer: COMMERCIAL

## 2025-08-01 ENCOUNTER — APPOINTMENT (OUTPATIENT)
Dept: RADIOLOGY | Facility: CLINIC | Age: 70
End: 2025-08-01
Attending: ORTHOPAEDIC SURGERY
Payer: COMMERCIAL

## 2025-08-01 VITALS — WEIGHT: 162 LBS | BODY MASS INDEX: 24.55 KG/M2 | HEIGHT: 68 IN

## 2025-08-01 DIAGNOSIS — M25.462 EFFUSION OF LEFT KNEE: ICD-10-CM

## 2025-08-01 DIAGNOSIS — M25.562 LEFT KNEE PAIN, UNSPECIFIED CHRONICITY: ICD-10-CM

## 2025-08-01 DIAGNOSIS — M17.12 PRIMARY OSTEOARTHRITIS OF LEFT KNEE: ICD-10-CM

## 2025-08-01 DIAGNOSIS — M25.562 LEFT KNEE PAIN, UNSPECIFIED CHRONICITY: Primary | ICD-10-CM

## 2025-08-01 PROCEDURE — 20610 DRAIN/INJ JOINT/BURSA W/O US: CPT | Performed by: ORTHOPAEDIC SURGERY

## 2025-08-01 PROCEDURE — 73564 X-RAY EXAM KNEE 4 OR MORE: CPT

## 2025-08-01 PROCEDURE — 99214 OFFICE O/P EST MOD 30 MIN: CPT | Performed by: ORTHOPAEDIC SURGERY

## 2025-08-01 RX ORDER — BUPIVACAINE HYDROCHLORIDE 2.5 MG/ML
2 INJECTION, SOLUTION INFILTRATION; PERINEURAL
Status: COMPLETED | OUTPATIENT
Start: 2025-08-01 | End: 2025-08-01

## 2025-08-01 RX ORDER — METHYLPREDNISOLONE ACETATE 40 MG/ML
2 INJECTION, SUSPENSION INTRA-ARTICULAR; INTRALESIONAL; INTRAMUSCULAR; SOFT TISSUE
Status: COMPLETED | OUTPATIENT
Start: 2025-08-01 | End: 2025-08-01

## 2025-08-01 RX ORDER — LIDOCAINE HYDROCHLORIDE 10 MG/ML
2 INJECTION, SOLUTION INFILTRATION; PERINEURAL
Status: COMPLETED | OUTPATIENT
Start: 2025-08-01 | End: 2025-08-01

## 2025-08-01 RX ADMIN — LIDOCAINE HYDROCHLORIDE 2 ML: 10 INJECTION, SOLUTION INFILTRATION; PERINEURAL at 11:00

## 2025-08-01 RX ADMIN — BUPIVACAINE HYDROCHLORIDE 2 ML: 2.5 INJECTION, SOLUTION INFILTRATION; PERINEURAL at 11:00

## 2025-08-01 RX ADMIN — METHYLPREDNISOLONE ACETATE 2 ML: 40 INJECTION, SUSPENSION INTRA-ARTICULAR; INTRALESIONAL; INTRAMUSCULAR; SOFT TISSUE at 11:00

## 2025-08-11 ENCOUNTER — TELEPHONE (OUTPATIENT)
Dept: OBGYN CLINIC | Facility: CLINIC | Age: 70
End: 2025-08-11